# Patient Record
Sex: FEMALE | Race: WHITE | NOT HISPANIC OR LATINO | Employment: UNEMPLOYED | URBAN - METROPOLITAN AREA
[De-identification: names, ages, dates, MRNs, and addresses within clinical notes are randomized per-mention and may not be internally consistent; named-entity substitution may affect disease eponyms.]

---

## 2024-06-13 ENCOUNTER — OFFICE VISIT (OUTPATIENT)
Dept: URGENT CARE | Age: 64
End: 2024-06-13
Payer: COMMERCIAL

## 2024-06-13 VITALS
OXYGEN SATURATION: 98 % | TEMPERATURE: 98.6 F | DIASTOLIC BLOOD PRESSURE: 64 MMHG | RESPIRATION RATE: 20 BRPM | WEIGHT: 165 LBS | HEART RATE: 77 BPM | BODY MASS INDEX: 27.49 KG/M2 | HEIGHT: 65 IN | SYSTOLIC BLOOD PRESSURE: 113 MMHG

## 2024-06-13 DIAGNOSIS — Z23 ENCOUNTER FOR IMMUNIZATION: ICD-10-CM

## 2024-06-13 DIAGNOSIS — S60.459A WOOD SPLINTER UNDER FINGERNAIL: Primary | ICD-10-CM

## 2024-06-13 PROCEDURE — 90471 IMMUNIZATION ADMIN: CPT | Performed by: PHYSICIAN ASSISTANT

## 2024-06-13 PROCEDURE — G0383 LEV 4 HOSP TYPE B ED VISIT: HCPCS | Performed by: PHYSICIAN ASSISTANT

## 2024-06-13 PROCEDURE — 90715 TDAP VACCINE 7 YRS/> IM: CPT

## 2024-06-13 PROCEDURE — 10120 INC&RMVL FB SUBQ TISS SMPL: CPT | Performed by: PHYSICIAN ASSISTANT

## 2024-06-13 PROCEDURE — S9083 URGENT CARE CENTER GLOBAL: HCPCS | Performed by: PHYSICIAN ASSISTANT

## 2024-06-13 RX ORDER — NYSTATIN 500000 [USP'U]/1
2 TABLET, COATED ORAL
COMMUNITY

## 2024-06-13 RX ORDER — CIPROFLOXACIN 500 MG/1
500 TABLET, FILM COATED ORAL EVERY 12 HOURS SCHEDULED
Qty: 10 TABLET | Refills: 0 | Status: SHIPPED | OUTPATIENT
Start: 2024-06-13 | End: 2024-06-18

## 2024-06-13 RX ORDER — ALPRAZOLAM 0.5 MG/1
0.5 TABLET ORAL 3 TIMES DAILY
COMMUNITY
Start: 2024-06-03

## 2024-06-13 RX ORDER — PROGESTERONE 100 MG/1
250 CAPSULE ORAL
COMMUNITY

## 2024-06-13 RX ORDER — FLECAINIDE ACETATE 50 MG/1
25 TABLET ORAL 2 TIMES DAILY
COMMUNITY
Start: 2024-01-08 | End: 2025-01-02

## 2024-06-13 RX ORDER — MAGNESIUM OXIDE 400 MG/1
200 TABLET ORAL 2 TIMES DAILY
COMMUNITY

## 2024-06-13 RX ORDER — OLMESARTAN MEDOXOMIL AND HYDROCHLOROTHIAZIDE 20/12.5 20; 12.5 MG/1; MG/1
1 TABLET ORAL DAILY
COMMUNITY
Start: 2024-01-08

## 2024-06-13 RX ORDER — METOPROLOL SUCCINATE 50 MG/1
50 TABLET, EXTENDED RELEASE ORAL DAILY
COMMUNITY
Start: 2024-01-08 | End: 2025-01-02

## 2024-06-14 NOTE — PROGRESS NOTES
[facility]        NAME: Rena Barron is a 63 y.o. female  : 1960    MRN: [id number]  DATE: 2024  TIME: 9:33 PM    Assessment and Plan   Wood splinter under fingernail [S60.459A]  1. Wood splinter under fingernail  Tdap Vaccine greater than or equal to 6yo    ciprofloxacin (CIPRO) 500 mg tablet      2. Encounter for immunization  Tdap Vaccine greater than or equal to 6yo      Patient with a wood splinter under the fingernail patient request attempted removal.  Removal attempted with partial removal of the foreign body.  Discussed with patient that her body will also expel the foreign body as soon as possible.  Recommend Epsom salt soaks as this will help assist with this and also prevent infection.  Patient is started on ciprofloxacin for infection prophylaxis as this is likely treated wood.  Tdap is updated as last Tdap was in .      Patient Instructions     Patient Instructions   Take antibiotic as prescribed.  If you develop any tendon pain stop antibiotic.  Recommend Epsom salt soaks 2-3 times daily to help break up the splinter.  Know your body will eventually kick it out and foreign body reaction or as the nail grows.  Watch for any signs of infection including worsening redness, pain, or drainage from the area and report to the emergency department if they occur.     Her tetanus shot was updated today and is good for another 10 years.    Follow up with PCP in 3-5 days.  Proceed to  ER if symptoms worsen.    If tests have been performed at [facility], our office will contact you with results if changes need to be made to the care plan discussed with you at the visit. You can review your full results on [facility] My[facility].     Chief Complaint     Chief Complaint   Patient presents with    Nail Problem     About 6 hours ago pt splinter stuck under her right point nail.          History of Present Illness       63-year-old female presents with subungual splinter to her right index  finger.  She is right-handed.  Patient reports that she was closing a blind and when she slammed the going down she got a splinter from the window frame in her finger.  Patient reports that she has tried to express it with no result.        Review of Systems   Review of Systems   Constitutional:  Negative for chills and fever.   Skin:  Positive for wound.         Current Medications       Current Outpatient Medications:     ALPRAZolam (XANAX) 0.5 mg tablet, Take 0.5 mg by mouth 3 (three) times a day, Disp: , Rfl:     apixaban (ELIQUIS) 5 mg, Take 5 mg by mouth 2 (two) times a day, Disp: , Rfl:     ciprofloxacin (CIPRO) 500 mg tablet, Take 1 tablet (500 mg total) by mouth every 12 (twelve) hours for 5 days, Disp: 10 tablet, Rfl: 0    flecainide (TAMBOCOR) 50 mg tablet, Take 25 mg by mouth 2 (two) times a day, Disp: , Rfl:     metoprolol succinate (TOPROL-XL) 50 mg 24 hr tablet, Take 50 mg by mouth daily, Disp: , Rfl:     olmesartan-hydrochlorothiazide (BENICAR HCT) 20-12.5 MG per tablet, Take 1 tablet by mouth daily, Disp: , Rfl:     Cholecalciferol 1.25 MG (18067 UT) TABS, Take by mouth, Disp: , Rfl:     magnesium oxide (MAG-OX) 400 mg tablet, Take 200 mg by mouth 2 (two) times a day, Disp: , Rfl:     Magnesium Oxide -Mg Supplement 200 MG TABS, Take 2 tablets by mouth 2 (two) times a day, Disp: , Rfl:     nystatin (MYCOSTATIN) 500,000 units tablet, Take 2 tablets by mouth, Disp: , Rfl:     Progesterone 100 MG CAPS, 250 mg, Disp: , Rfl:     PROGESTERONE MICRONIZED TD, Take 275 mg by mouth daily, Disp: , Rfl:     Current Allergies     Allergies as of 06/13/2024    (No Known Allergies)            The following portions of the patient's history were reviewed and updated as appropriate: allergies, current medications, past family history, past medical history, past social history, past surgical history and problem list.     No past medical history on file.    No past surgical history on file.    No family history on  "file.      Medications have been verified.        Objective   /64   Pulse 77   Temp 98.6 °F (37 °C)   Resp 20   Ht 5' 5\" (1.651 m)   Wt 74.8 kg (165 lb)   SpO2 98%   BMI 27.46 kg/m²   No LMP recorded.       Physical Exam     Physical Exam  Vitals and nursing note reviewed.   Constitutional:       General: She is awake. She is not in acute distress.     Appearance: Normal appearance. She is well-developed and well-groomed. She is not ill-appearing, toxic-appearing or diaphoretic.   HENT:      Head: Normocephalic and atraumatic.      Right Ear: Hearing and external ear normal.      Left Ear: Hearing and external ear normal.   Eyes:      General: Lids are normal. Vision grossly intact. Gaze aligned appropriately.   Cardiovascular:      Rate and Rhythm: Normal rate.   Pulmonary:      Effort: Pulmonary effort is normal.      Comments: Patient is speaking in full sentences with no increased respiratory effort. No audible wheezing or stridor.   Musculoskeletal:      Cervical back: Normal range of motion.      Comments: 1 cm subungual splinter of the right index finger on the radial aspect.    Skin:     General: Skin is warm and dry.   Neurological:      Mental Status: She is alert and oriented to person, place, and time.      Coordination: Coordination is intact.      Gait: Gait is intact.   Psychiatric:         Attention and Perception: Attention and perception normal.         Mood and Affect: Mood and affect normal.         Speech: Speech normal.         Behavior: Behavior normal. Behavior is cooperative.     Universal Protocol:  Consent: Verbal consent obtained. Written consent not obtained.  Risks and benefits: risks, benefits and alternatives were discussed  Consent given by: patient  Time out: Immediately prior to procedure a \"time out\" was called to verify the correct patient, procedure, equipment, support staff and site/side marked as required.  Patient understanding: patient states understanding of " "the procedure being performed  Patient identity confirmed: verbally with patient  Foreign body removal    Date/Time: 6/13/2024 8:30 PM    Performed by: Nahed Abreu PA-C  Authorized by: Nahed Abreu PA-C  Body area: skin  General location: upper extremity  Location details: right index finger  Anesthesia: local infiltration    Anesthesia:  Local Anesthetic: lidocaine 2% without epinephrine  Anesthetic total: 1 mL    Sedation:  Patient sedated: no  Patient restrained: no  Patient cooperative: yes  Localization method: visualized  Removal mechanism: scalpel and forceps  Dressing: dressing applied  Tendon involvement: none  Depth: subcutaneous  Complexity: simple  Objects recovered: wood splinter shards  Post-procedure assessment: foreign body not removed  Patient tolerance: patient tolerated the procedure well with no immediate complications  Comments: Partial removal of splinter                  Note: Portions of this record may have been created with voice recognition software. Occasional wrong word or \"sound a like\" substitutions may have occurred due to the inherent limitations of voice recognition software. Please read the chart carefully and recognize, using context, where substitutions have occurred.*      "

## 2024-06-14 NOTE — PATIENT INSTRUCTIONS
Take antibiotic as prescribed.  If you develop any tendon pain stop antibiotic.  Recommend Epsom salt soaks 2-3 times daily to help break up the splinter.  Know your body will eventually kick it out and foreign body reaction or as the nail grows.  Watch for any signs of infection including worsening redness, pain, or drainage from the area and report to the emergency department if they occur.     Her tetanus shot was updated today and is good for another 10 years.